# Patient Record
Sex: FEMALE | NOT HISPANIC OR LATINO | URBAN - METROPOLITAN AREA
[De-identification: names, ages, dates, MRNs, and addresses within clinical notes are randomized per-mention and may not be internally consistent; named-entity substitution may affect disease eponyms.]

---

## 2017-10-16 ENCOUNTER — OFFICE VISIT (OUTPATIENT)
Dept: OPHTHALMOLOGY | Facility: CLINIC | Age: 57
End: 2017-10-16

## 2017-10-16 DIAGNOSIS — D49.2 GROWTH OF EYELID: ICD-10-CM

## 2017-10-16 DIAGNOSIS — H52.223 REGULAR ASTIGMATISM OF BOTH EYES: ICD-10-CM

## 2017-10-16 DIAGNOSIS — H02.889 MEIBOMIAN GLAND DYSFUNCTION: ICD-10-CM

## 2017-10-16 DIAGNOSIS — Z96.1 PSEUDOPHAKIA OF BOTH EYES: ICD-10-CM

## 2017-10-16 DIAGNOSIS — E11.9 TYPE 2 DIABETES MELLITUS WITHOUT RETINOPATHY (H): Primary | ICD-10-CM

## 2017-10-16 DIAGNOSIS — H40.003 GLAUCOMA SUSPECT OF BOTH EYES: ICD-10-CM

## 2017-10-16 ASSESSMENT — REFRACTION
OS_AXIS: 012
OD_AXIS: 013
OS_SPHERE: -2.75
OD_SPHERE: -1.50
OD_CYLINDER: +1.25
OS_CYLINDER: +1.00

## 2017-10-16 ASSESSMENT — REFRACTION_WEARINGRX
OD_CYLINDER: +1.00
OS_SPHERE: -3.00
OD_SPHERE: -1.50
OD_AXIS: 038
OS_AXIS: 015
OS_CYLINDER: +1.00
SPECS_TYPE: BIFOCAL
OS_ADD: +2.50
OD_ADD: +2.50

## 2017-10-16 ASSESSMENT — EXTERNAL EXAM - LEFT EYE: OS_EXAM: NORMAL

## 2017-10-16 ASSESSMENT — CUP TO DISC RATIO
OS_RATIO: 0.6
OD_RATIO: 0.7

## 2017-10-16 ASSESSMENT — REFRACTION_MANIFEST
OD_CYLINDER: +1.00
OS_CYLINDER: +1.00
OD_ADD: +2.50
OD_SPHERE: -1.50
OS_AXIS: 014
OS_SPHERE: -3.00
OD_AXIS: 040
OS_ADD: +2.50

## 2017-10-16 ASSESSMENT — TONOMETRY
OD_IOP_MMHG: 12
OS_IOP_MMHG: 15
IOP_METHOD: ICARE

## 2017-10-16 ASSESSMENT — VISUAL ACUITY
OS_CC+: -2
OD_CC+: +1
OS_CC: 20/30
CORRECTION_TYPE: GLASSES
METHOD: SNELLEN - LINEAR
OS_CC: J2
OD_CC: J2
OD_CC: 20/40

## 2017-10-16 ASSESSMENT — CONF VISUAL FIELD
METHOD: COUNTING FINGERS
OS_NORMAL: 1
OD_NORMAL: 1

## 2017-10-16 NOTE — MR AVS SNAPSHOT
After Visit Summary   10/16/2017    Mariam Balderrama    MRN: [id number]           Patient Information     Date Of Birth          1960        Visit Information        Provider Department      10/16/2017 1:00 PM Karthik Kennedy, OD [facility]        Today's Diagnoses     Type 2 diabetes mellitus without retinopathy (H)    -  1    Regular astigmatism of both eyes        Glaucoma suspect of both eyes        Pseudophakia of both eyes           Follow-ups after your visit        Follow-up notes from your care team     Return in about 1 year (around 10/16/2018) for Comprehensive Eye Exam.      Your next 10 appointments already scheduled     Oct 19, 2017  2:00 PM CDT   TECH with MM UMP MME TECH   [facility]    [facility]  [address] [address] [phone]              Future tests that were ordered for you today     Open Future Orders        Priority Expected Expires Ordered    OCT Optic Nerve RNFL Optovue OU (both eyes) Routine  4/16/2019 10/16/2017    OVF 24-2 Dynamic OU Routine  10/16/2018 10/16/2017            Who to contact     Please call your clinic at [phone] to:    Ask questions about your health    Make or cancel appointments    Discuss your medicines    Learn about your test results    Speak to your doctor   If you have compliments or concerns about an experience at your clinic, or if you wish to file a complaint, please contact [facility] Patient Relations at [phone] or email us at [email or web address]         Additional Information About Your Visit        MyChart Information     moziy is an electronic gateway that provides easy, online access to your medical records. With moziy, you can request a clinic appointment, read your test results, renew a prescription or communicate with your care team.     To sign up  for MyChart visit the website at [email or web address]   You will be asked to enter the access code listed below, as well as some personal information. Please follow the directions to create your username and password.     Your access code is: [email or web address]  Expires: 2018  6:31 AM     Your access code will  in 90 days. If you need help or a new code, please contact your [facility] or call [phone] for assistance.        Care EveryWhere ID     This is your Care EveryWhere ID. This could be used by other organizations to access your [facility] medical records  [id number]         Blood Pressure from Last 3 Encounters:   No data found for BP    Weight from Last 3 Encounters:   No data found for Wt              We Performed the Following     REFRACTION [00382]          Today's Medication Changes          These changes are accurate as of: 10/16/17  2:31 PM.  If you have any questions, ask your nurse or doctor.               These medicines have changed or have updated prescriptions.        Dose/Directions    metFORMIN 1000 MG tablet   Commonly known as:  GLUCOPHAGE   This may have changed:  Another medication with the same name was removed. Continue taking this medication, and follow the directions you see here.   Changed by:  Karthik Kennedy, OD        Dose:  1000 mg   Take 1,000 mg by mouth 2 times daily (with meals)   Refills:  0                Primary Care Provider Office Phone # Fax #    Luisana Crawford [phone]       [facility] [address]  [facility] [address]        Equal Access to Services     VENITA WILKINS : Hadii river erwin hadasho Soliloali, waaxda luqadaha, qaybta kaalmada adeegyagallo, idania patel. So [facility] [phone].    ATENCIÓN: Si habla español, tiene a garza disposición servicios gratuitos de asistencia lingüística. Llame al [phone].    We comply with applicable federal civil rights laws and  [place] laws. We do not discriminate on the basis of race, color, national origin, age, disability, sex, sexual orientation, or gender identity.            Thank you!     Thank you for choosing [facility]  for your care. Our goal is always to provide you with excellent care. Hearing back from our patients is one way we can continue to improve our services. Please take a few minutes to complete the written survey that you may receive in the mail after your visit with us. Thank you!             Your Updated Medication List - Protect others around you: Learn how to safely use, store and throw away your medicines at www.disposemymeds.org.          This list is accurate as of: 10/16/17  2:31 PM.  Always use your most recent med list.                   Brand Name Dispense Instructions for use Diagnosis    INSULIN ASPART SC      Inject 1 Dose Subcutaneous 2 times daily        metFORMIN 1000 MG tablet    GLUCOPHAGE     Take 1,000 mg by mouth 2 times daily (with meals)        * UNKNOWN TO PATIENT      Take 1 tablet by mouth daily For cholesterol        * UNKNOWN TO PATIENT      Take 1 tablet by mouth daily Anti-depressant        * Notice:  This list has 2 medication(s) that are the same as other medications prescribed for you. Read the directions carefully, and ask your doctor or other care provider to review them with you.

## 2017-10-16 NOTE — NURSING NOTE
Chief Complaints and History of Present Illnesses   Patient presents with     COMPREHENSIVE EYE EXAM     HPI    Affected eye(s):  Both   Symptoms:     Decreased vision (Comment: at distance)   No difficulty with reading   No floaters   No flashes   Dryness   Burning      Duration:  2 weeks   Frequency:  Constant       Do you have eye pain now?:  No      Comments:   Chief Complaints and History of Present Illnesses   Patient presents with     COMPREHENSIVE EYE EXAM     HPI    Affected eye(s):  Both   Symptoms:     Decreased vision (Comment: at distance)   No difficulty with reading   No floaters   No flashes   Dryness   Burning      Duration:  2 weeks   Frequency:  Constant       Do you have eye pain now?:  No      Comments:    Chief Complaints and History of Present Illnesses   Patient presents with     COMPREHENSIVE EYE EXAM     HPI    Affected eye(s):  Both   Symptoms:     Decreased vision (Comment: at distance)   No difficulty with reading   No floaters   No flashes   Dryness   Burning      Duration:  2 weeks   Frequency:  Constant       Do you have eye pain now?:  No      Comments:  Pt dx: DM 2 four years ago. Started insulin one year ago; also on Metformin 1000 BID.  Pt states avg FBS for last two weeks has been 240 - 260. She states this is when she started noted diminished VA at distance.   A1C was 12 three months ago.  Last eye exam / [facility] 1 year ago. / PG from that visit.  Nancy Pitt COT 1:16 PM 10/16/2017

## 2017-10-16 NOTE — PROGRESS NOTES
Assessment/Plan  (E11.9) Type 2 diabetes mellitus without retinopathy (H)  (primary encounter diagnosis)  Comment: No retinopathy OU  Plan:  Educated patient on condition and clinical findings. Dispensed spectacle prescription for full time wear. Educated patient on possibility of adaptation period, if symptoms do not improve return to clinic for further testing.    (H52.223) Regular astigmatism of both eyes  Plan: REFRACTION [35399]         Educated patient on condition and clinical findings. Dispensed spectacle prescription for full time wear. Educated patient on possibility of adaptation period, if symptoms do not improve return to clinic for further testing.    (H40.003) Glaucoma suspect of both eyes  Comment: Secondary to significant cupping OU, patient states she had a laser procedure 1 year ago  Plan: OCT Optic Nerve RNFL Optovue OU (both eyes), OVF 24-2 Dynamic OU         Return to clinic in 1 month for visual field, oct, and applanation tonometry. Patient to provide previous records at that time.    (Z96.1) Pseudophakia of both eyes  Comment: Stable.   Plan: Monitor annually.    (H02.89) Meibomian gland dysfunction  Comment: Symptomatic  Plan:  Recommended warm compresses twice each day for ten minutes and artificial tears prn. Monitor annually, or sooner if symptoms worsen.    (D49.2) Growth of eyelid  Comment: Patient reports lesion is getting larger  Plan:  Referred to Dr. Andino for consultation.    Return to clinic in 1 month for glaucoma work-up.    Complete documentation of historical and exam elements from today's encounter can  be found in the full encounter summary report (not reduplicated in this progress  note). I personally obtained the chief complaint(s) and history of present illness. I  confirmed and edited as necessary the review of systems, past medical/surgical  history, family history, social history, and examination findings as documented by  others; and I examined the patient  myself. I personally reviewed the relevant tests,  images, and reports as documented above. I formulated and edited as necessary the  assessment and plan and discussed the findings and management plan with the  patient and family.    Karthik Kennedy OD, FAAO

## 2017-10-19 ENCOUNTER — ALLIED HEALTH/NURSE VISIT (OUTPATIENT)
Dept: OPHTHALMOLOGY | Facility: CLINIC | Age: 57
End: 2017-10-19

## 2017-10-19 DIAGNOSIS — H40.003 GLAUCOMA SUSPECT OF BOTH EYES: Primary | ICD-10-CM

## 2017-10-19 ASSESSMENT — VISUAL ACUITY
OS_CC: 20/30
METHOD: SNELLEN - LINEAR
OD_CC: 20/40

## 2017-10-19 ASSESSMENT — SLIT LAMP EXAM - LIDS
COMMENTS: TR MGD
COMMENTS: TR MGD

## 2017-10-19 ASSESSMENT — TONOMETRY
IOP_METHOD: APPLANATION
OS_IOP_MMHG: 16
OD_IOP_MMHG: 18

## 2017-10-19 ASSESSMENT — REFRACTION_WEARINGRX
OS_CYLINDER: +1.00
OS_AXIS: 015
OD_SPHERE: -1.50
OD_ADD: +2.50
OS_ADD: +2.50
OS_SPHERE: -2.75
OD_CYLINDER: +1.00
SPECS_TYPE: BIFOCAL
OD_AXIS: 038

## 2017-10-19 ASSESSMENT — CONF VISUAL FIELD
OD_NORMAL: 1
OS_NORMAL: 1

## 2017-10-19 NOTE — PROGRESS NOTES
Assessment/Plan  (H40.003) Glaucoma suspect of both eyes  (primary encounter diagnosis)  Comment: Likely physiological cupping OU, normal OCT and clean fields OU  Plan: OCT Optic Nerve RNFL Spectralis OU (both eyes), HVF 24-2 OU    Educated patient on clinical findings. Monitor annually, no treatment indicated at this time.    This visit billed as technician only.    Complete documentation of historical and exam elements from today's encounter can  be found in the full encounter summary report (not reduplicated in this progress  note). I personally obtained the chief complaint(s) and history of present illness. I  confirmed and edited as necessary the review of systems, past medical/surgical  history, family history, social history, and examination findings as documented by  others; and I examined the patient myself. I personally reviewed the relevant tests,  images, and reports as documented above. I formulated and edited as necessary the  assessment and plan and discussed the findings and management plan with the  patient and family.    Karthik Kennedy, BLADIMIR, FAAO

## 2017-10-19 NOTE — MR AVS SNAPSHOT
After Visit Summary   10/19/2017    Mariam Balderrama    MRN: [id number]           Patient Information     Date Of Birth          1960        Visit Information        Provider Department      10/19/2017 2:00 PM [email or web address]         Follow-ups after your visit        Your next 10 appointments already scheduled     Oct 31, 2017  8:00 AM CDT   (Arrive by 7:45 AM)   NEW PLASTICS with Thanh Andino MD   [facility] ([facility] [place])    [address] [place] [address]  4th [facility] [address] [phone]              Who to contact     Please call your clinic at [phone] to:    Ask questions about your health    Make or cancel appointments    Discuss your medicines    Learn about your test results    Speak to your doctor   If you have compliments or concerns about an experience at your clinic, or if you wish to file a complaint, please contact [facility] Patient Relations at [phone] or email us at [email or web address]         Additional Information About Your Visit        MyChart Information     Amprius is an electronic gateway that provides easy, online access to your medical records. With Amprius, you can request a clinic appointment, read your test results, renew a prescription or communicate with your care team.     To sign up for Etixt visit the website at [email or web address]   You will be asked to enter the access code listed below, as well as some personal information. Please follow the directions to create your username and password.     Your access code is: [email or web address]  Expires: 2018  6:31 AM     Your access code will  in 90 days. If you need help or a new code, please contact your [facility] or call [phone] for assistance.        Care EveryWhere ID     This is your Care EveryWhere ID. This could be used by other  organizations to access your [place] medical records  [id number]         Blood Pressure from Last 3 Encounters:   No data found for BP    Weight from Last 3 Encounters:   No data found for Wt              Today, you had the following     No orders found for display       Primary Care Provider Office Phone # Fax #    Luisana Crawford [phone]       [facility] [address]  [facility] [address]        Equal Access to Services     VENITA WILKINS : Hadii aad ku hadasho Soomaali, waaxda luqadaha, qaybta kaalmada adeegyada, waxay idiin hayaan adeeg khmarthash lajesusita ah. So [facility] [phone].    ATENCIÓN: Si habla español, tiene a garza disposición servicios gratuitos de asistencia lingüística. Llame al [phone].    We comply with applicable federal civil rights laws and [place] laws. We do not discriminate on the basis of race, color, national origin, age, disability, sex, sexual orientation, or gender identity.            Thank you!     Thank you for choosing [facility]  for your care. Our goal is always to provide you with excellent care. Hearing back from our patients is one way we can continue to improve our services. Please take a few minutes to complete the written survey that you may receive in the mail after your visit with us. Thank you!             Your Updated Medication List - Protect others around you: Learn how to safely use, store and throw away your medicines at www.disposemymeds.org.          This list is accurate as of: 10/19/17  3:09 PM.  Always use your most recent med list.                   Brand Name Dispense Instructions for use Diagnosis    INSULIN ASPART SC      Inject 1 Dose Subcutaneous 2 times daily        metFORMIN 1000 MG tablet    GLUCOPHAGE     Take 1,000 mg by mouth 2 times daily (with meals)        * UNKNOWN TO PATIENT      Take 1 tablet by mouth daily For cholesterol        * UNKNOWN TO PATIENT      Take 1 tablet by mouth daily  Anti-depressant        * Notice:  This list has 2 medication(s) that are the same as other medications prescribed for you. Read the directions carefully, and ask your doctor or other care provider to review them with you.

## 2017-10-19 NOTE — NURSING NOTE
Chief Complaints and History of Present Illnesses   Patient presents with     Glaucoma Suspect Follow Up     OVF 24-2/OCT today     HPI    Affected eye(s):  Both   Symptoms:        Duration:  1 week   Frequency:  Constant       Do you have eye pain now?:  No      Comments:  No eye medications including over the counter  June Espinoza COT 2:22 PM October 19, 2017

## 2017-10-31 ENCOUNTER — OFFICE VISIT (OUTPATIENT)
Dept: OPHTHALMOLOGY | Facility: CLINIC | Age: 57
End: 2017-10-31

## 2017-10-31 DIAGNOSIS — H05.89 ORBITAL MASS: Primary | ICD-10-CM

## 2017-10-31 RX ORDER — ERYTHROMYCIN 5 MG/G
1 OINTMENT OPHTHALMIC 2 TIMES DAILY
Qty: 1 TUBE | Refills: 0
Start: 2017-10-31 | End: 2017-11-07

## 2017-10-31 RX ORDER — LIDOCAINE HYDROCHLORIDE AND EPINEPHRINE 15; 5 MG/ML; UG/ML
1 INJECTION, SOLUTION EPIDURAL ONCE
Qty: 1 ML | Refills: 0 | OUTPATIENT
Start: 2017-10-31 | End: 2017-10-31

## 2017-10-31 ASSESSMENT — SLIT LAMP EXAM - LIDS: COMMENTS: NORMAL

## 2017-10-31 ASSESSMENT — VISUAL ACUITY
OS_CC: 20/25
OD_CC: 20/40
OD_CC+: -
METHOD: SNELLEN - LINEAR
CORRECTION_TYPE: GLASSES

## 2017-10-31 ASSESSMENT — TONOMETRY
OD_IOP_MMHG: 15
IOP_METHOD: ICARE
OS_IOP_MMHG: 15

## 2017-10-31 ASSESSMENT — CONF VISUAL FIELD
OD_NORMAL: 1
OS_NORMAL: 1

## 2017-10-31 NOTE — LETTER
10/31/2017         RE:  :  MRN: Mariam Balderrama  1960  [id number]     Dear Dr. Kennedy,    Thank you for asking me to see your patient, Mariam Balderrama, for an oculoplastic   consultation.  My assessment and plan are below.  For further details, please see my attached clinic note.          Assessment & Plan     Mariam Balderrama is a 57 year old female with the following diagnoses:   1. Orbital mass      Discussed options, plan excision of the medial canthus mass through mini-Monroe style incision.        Again, thank you for allowing me to participate in the care of your patient.      Sincerely,    Thanh Andino MD  Department of Ophthalmology and Visual Neurosciences  [facility]    CC: SOPHIE  [facility]   [address] [place] [id number]  VIA Facsimile: [phone]

## 2017-10-31 NOTE — PROGRESS NOTES
Chief Complaints and History of Present Illnesses   Patient presents with     Consult For     lesion     Right medial canthus mass. Present for two years.  Enlarging.  Not painful at baseline but her glasses sit on it and causes irritation and discomfort.   No history of trauma to the area.          Assessment & Plan     Mariam Balderrama is a 57 year old female with the following diagnoses:   1. Orbital mass      Discussed options, plan excision of the medial canthus mass through mini-Monroe style incision.         Complete documentation of historical and exam elements from today's encounter can be found in the full encounter summary report (not reduplicated in this progress note). I personally obtained the chief complaint(s) and history of present illness.  I confirmed and edited as necessary the review of systems, past medical/surgical history, family history, social history, and examination findings as documented by others; and I examined the patient myself. I personally reviewed the relevant tests, images, and reports as documented above. I formulated and edited as necessary the assessment and plan and discussed the findings and management plan with the patient and family. - Thanh Andino MD    Preoperative diagnosis: Right medial canthus mass  Postoperative diagnosis: Right medial canthus mass  Surgeon: Thanh Andino MD  Procedure: Excision of right medial canthus mass  Anesthesia: 1.5% lidocaine with epinephrine  Specimen: Right medial canthus cystic mass  Description of procedure:  Mariam Balderrama was brought to the minor procedure room. The area was infiltrated with local anesthetic as above. She was prepped and draped in the usual sterile fashion. A 15 blade was used to make an incision in the medial canthal area. Dissection was carried out posteriorly in the direction of the medial orbit. A well-demarcated cystic mass was encountered. This was dissected out with accommodation of blunt and sharp dissection.  Hemostasis was then obtained with cautery. The area was inspected, and then the incision was closed with interrupted 6-0 plain gut sutures. She tolerated the procedure well. Erythromycin ophthalmic ointment was applied.   I performed the entire procedure  Thanh Andino MD

## 2017-10-31 NOTE — MR AVS SNAPSHOT
After Visit Summary   10/31/2017    Mairam Balderrama    MRN: [id number]           Patient Information     Date Of Birth          1960        Visit Information        Provider Department      10/31/2017 7:45 AM Thanh Andino MD; [facility]        Today's Diagnoses     Orbital mass    -  1       Follow-ups after your visit        Future tests that were ordered for you today     Open Future Orders        Priority Expected Expires Ordered    Surgical pathology exam Routine  2018 10/31/2017            Who to contact     Please call your clinic at [phone] to:    Ask questions about your health    Make or cancel appointments    Discuss your medicines    Learn about your test results    Speak to your doctor   If you have compliments or concerns about an experience at your clinic, or if you wish to file a complaint, please contact [facility] Patient Relations at [phone] or email us at [email or web address]         Additional Information About Your Visit        MyChart Information     ACTV8t is an electronic gateway that provides easy, online access to your medical records. With The Combine, you can request a clinic appointment, read your test results, renew a prescription or communicate with your care team.     To sign up for ACTV8t visit the website at [email or web address]   You will be asked to enter the access code listed below, as well as some personal information. Please follow the directions to create your username and password.     Your access code is: [email or web address]  Expires: 2018  6:31 AM     Your access code will  in 90 days. If you need help or a new code, please contact your [facility] or call [phone] for assistance.        Care EveryWhere ID     This is your Care EveryWhere ID. This could be used by other organizations to access your [facility]  records  [id number]         Blood Pressure from Last 3 Encounters:   No data found for BP    Weight from Last 3 Encounters:   No data found for Wt              We Performed the Following     External Photos OU (both eyes)     Surgical pathology exam          Today's Medication Changes          These changes are accurate as of: 10/31/17  4:20 PM.  If you have any questions, ask your nurse or doctor.               Start taking these medicines.        Dose/Directions    erythromycin ophthalmic ointment   Commonly known as:  ROMYCIN   Started by:  Thanh Andino MD        Dose:  1 Application   Place 1 Application into the right eye 2 times daily for 7 days   Quantity:  1 Tube   Refills:  0       lidocaine-EPINEPHrine 1.5 %-1:925263 Soln injection   Started by:  Thanh Andino MD        Dose:  1 mL   1 mL by Other route once for 1 dose   Quantity:  1 mL   Refills:  0            Where to get your medicines      Some of these will need a paper prescription and others can be bought over the counter.  Ask your nurse if you have questions.     You don't need a prescription for these medications     erythromycin ophthalmic ointment    lidocaine-EPINEPHrine 1.5 %-1:179812 Soln injection                Primary Care Provider Office Phone # Fax #    Luisanakimi Carlsonba [phone]       [facility] [address]  [facility] [address]        Equal Access to Services     VENITA WILKINS AH: Harry arreolao Soliloali, waaxda luqadaha, qaybta kaalmada adeegyada, idania patel. So [facility] [phone].    ATENCIÓN: Si habla español, tiene a garza disposición servicios gratuitos de asistencia lingüística. Llame al [phone].    We comply with applicable federal civil rights laws and [place] laws. We do not discriminate on the basis of race, color, national origin, age, disability, sex, sexual orientation, or gender identity.            Thank you!     Thank you for choosing [facility]  for your care. Our goal is always to provide you with excellent care. Hearing back from our patients is one way we can continue to improve our services. Please take a few minutes to complete the written survey that you may receive in the mail after your visit with us. Thank you!             Your Updated Medication List - Protect others around you: Learn how to safely use, store and throw away your medicines at www.disposemymeds.org.          This list is accurate as of: 10/31/17  4:20 PM.  Always use your most recent med list.                   Brand Name Dispense Instructions for use Diagnosis    erythromycin ophthalmic ointment    ROMYCIN    1 Tube    Place 1 Application into the right eye 2 times daily for 7 days        INSULIN ASPART SC      Inject 1 Dose Subcutaneous 2 times daily        lidocaine-EPINEPHrine 1.5 %-1:777888 Soln injection     1 mL    1 mL by Other route once for 1 dose        metFORMIN 1000 MG tablet    GLUCOPHAGE     Take 1,000 mg by mouth 2 times daily (with meals)        * UNKNOWN TO PATIENT      Take 1 tablet by mouth daily For cholesterol        * UNKNOWN TO PATIENT      Take 1 tablet by mouth daily Anti-depressant        * Notice:  This list has 2 medication(s) that are the same as other medications prescribed for you. Read the directions carefully, and ask your doctor or other care provider to review them with you.

## 2017-10-31 NOTE — LETTER
"November 3, 2017      Mariam Balderrama  [address] [address]        Dear Mariam,    Please see below for your test results. 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     Resulted Orders   Surgical pathology exam   Result Value Ref Range    Copath Report       Patient Name: MARIAM BALDERRAMA  MR#: [id number]  Specimen #: [id number]  Collected: 10/31/2017  Received: 10/31/2017  Reported: 11/2/2017 16:20  Ordering Phy(s): MANNY SANTOYO    For improved result formatting, select 'View Enhanced Report Format'  under Linked Documents section.    SPECIMEN(S):  Skin, right medial canthus    FINAL DIAGNOSIS:  A. Skin, right medial canthus:  - Benign adnexal tumor with ductal differentiation, most consistent with  chondroid syringoma  (see description)    I have personally reviewed all specimens and/or slides, including the  listed special stains, and used them with my medical judgement to  determine or confirm the final diagnosis.    Electronically signed out by:    Chin Lee M.D., PhD, Physicians    GROSS:  The specimen is received in formalin with proper patient identification,  labeled \"right medial canthal\", and consists of a 0.6 x 0.3 x 0.3 cm  well-circumscribed portion of pink-tan, rubbery soft tissue.  A  definitive epithelium is not g rossly appreciated.  The surface is inked  black, and the specimen is bisected and entirely submitted in one  cassette. (Dictated by: Loretta White 10/31/2017 11:56 AM)    MICROSCOPIC:  A. The specimen exhibits a lobular, nonencapsulated tumor with a  prominently myxoid stroma, which consists of small cords of  bland-appearing epithelial cells with ductal differentiation and small  foci of myxoid differentiation.  The lesion extends to the margins.    CPT Codes:  A: 36968-AH2.T, 96713-BO4.P    TESTING LAB LOCATION:  [facility], [address]   " [id number]  [phone]    COLLECTION SITE:  Client: [facility] [place] [facility] [place]           If you have any questions, please call the clinic to make an appointment.    Sincerely,    Thanh Andino MD    Oculoplastic and Orbital Surgery   Department of Ophthalmology and Visual Neurosciences  [facility]

## 2017-10-31 NOTE — NURSING NOTE
Chief Complaints and History of Present Illnesses   Patient presents with     Consult For     lesion     HPI    Affected eye(s):  Both   Symptoms:     No blurred vision      Frequency:  Constant       Do you have eye pain now?:  No      Comments:  Spot by the nose, right side. Has been there for 2 years. No pain until putting on glasses. Glasses don't sit correctly on her face because of it. It seems to be slowly getting bigger over time.    Nicci Miles COT 8:10 AM October 31, 2017

## 2017-11-02 LAB — COPATH REPORT: NORMAL

## 2018-06-06 ENCOUNTER — OFFICE VISIT (OUTPATIENT)
Dept: OPHTHALMOLOGY | Facility: CLINIC | Age: 58
End: 2018-06-06
Payer: COMMERCIAL

## 2018-06-06 DIAGNOSIS — E11.9 TYPE 2 DIABETES MELLITUS WITHOUT RETINOPATHY (H): ICD-10-CM

## 2018-06-06 DIAGNOSIS — H52.223 REGULAR ASTIGMATISM OF BOTH EYES: ICD-10-CM

## 2018-06-06 DIAGNOSIS — H04.123 DRY EYES, BILATERAL: Primary | ICD-10-CM

## 2018-06-06 DIAGNOSIS — H40.003 GLAUCOMA SUSPECT OF BOTH EYES: ICD-10-CM

## 2018-06-06 ASSESSMENT — TONOMETRY
OD_IOP_MMHG: 16
OS_IOP_MMHG: 17
IOP_METHOD: ICARE

## 2018-06-06 ASSESSMENT — VISUAL ACUITY
CORRECTION_TYPE: GLASSES
OD_CC: 20/30
METHOD: SNELLEN - LINEAR
OD_CC+: -2
OS_CC: 20/30

## 2018-06-06 ASSESSMENT — SLIT LAMP EXAM - LIDS
COMMENTS: NORMAL
COMMENTS: NORMAL

## 2018-06-06 ASSESSMENT — CONF VISUAL FIELD
METHOD: COUNTING FINGERS
OS_NORMAL: 1
OD_NORMAL: 1

## 2018-06-06 NOTE — NURSING NOTE
Chief Complaints and History of Present Illnesses   Patient presents with     Eye Problem Both Eyes     HPI    Affected eye(s):  Both   Symptoms:     No blurred vision   No decreased vision   Puffy eyes   Redness   Dryness      Duration:  3 weeks   Frequency:  Constant       Do you have eye pain now?:  No      Comments:  Had laser by [facility] in [place] office,  to reduce puffiness of bilateral lower lids 3 weeks ago. Since then above sign/symptoms started.  Not using art tears. Using prescription eye cream.    Nancy Pitt COT 2:10 PM 06/06/2018

## 2018-06-06 NOTE — MR AVS SNAPSHOT
After Visit Summary   2018    Mariam Balderrama    MRN: [id number]           Patient Information     Date Of Birth          1960        Visit Information        Provider Department      2018 1:40 PM Cleopatra Meyers MD [facility]        Today's Diagnoses     Dry eyes, bilateral - Both Eyes    -  1    Type 2 diabetes mellitus without retinopathy (H) - Both Eyes        Regular astigmatism of both eyes - Both Eyes        Glaucoma suspect of both eyes - Both Eyes           Follow-ups after your visit        Follow-up notes from your care team     Return in about 1 month (around 2018) for Comprehensive Exam, follow up- dry eye.      Your next 10 appointments already scheduled     2018  1:20 PM CDT   Return Visit with Cleopatra Meyers MD   [facility] ([facility])    [facility]  [address] [id number] [phone]              Who to contact     Please call your clinic at [phone] to:    Ask questions about your health    Make or cancel appointments    Discuss your medicines    Learn about your test results    Speak to your doctor            Additional Information About Your Visit        MyChart Information     [employer] is an electronic gateway that provides easy, online access to your medical records. With [employer], you can request a clinic appointment, read your test results, renew a prescription or communicate with your care team.     To sign up for treadalongt visit the website at [email or web address]   You will be asked to enter the access code listed below, as well as some personal information. Please follow the directions to create your username and password.     Your access code is: [email or web address]  Expires: 2018  6:30 AM     Your access code will  in 90 days. If you need help or a new code, please contact your [facility] or call [phone] for assistance.        Care EveryWhere ID     This is your Care EveryWhere ID. This could be used by other organizations to access your [facility] medical records  [id number]         Blood Pressure from Last 3 Encounters:   No data found for BP    Weight from Last 3 Encounters:   No data found for Wt              Today, you had the following     No orders found for display       Primary Care Provider Office Phone # Fax #    Luisana Crawford [phone]       [facility] [address]  [facility] [address]        Equal Access to Services     [facility] : Hadii aad ku hadasho Soomaali, waaxda luqadaha, qaybta kaalmada adeegyada, waxay idiin hayaan adedave starkey . So [facility] [phone].    ATENCIÓN: Si habla español, tiene a garza disposición servicios gratuitos de asistencia lingüística. Llame al [phone].    We comply with applicable federal civil rights laws and [place] laws. We do not discriminate on the basis of race, color, national origin, age, disability, sex, sexual orientation, or gender identity.            Thank you!     Thank you for choosing [facility]  for your care. Our goal is always to provide you with excellent care. Hearing back from our patients is one way we can continue to improve our services. Please take a few minutes to complete the written survey that you may receive in the mail after your visit with us. Thank you!             Your Updated Medication List - Protect others around you: Learn how to safely use, store and throw away your medicines at www.disposemymeds.org.          This list is accurate as of 6/6/18  2:39 PM.  Always use your most recent med list.                   Brand Name Dispense Instructions for use Diagnosis    INSULIN ASPART SC      Inject 1 Dose Subcutaneous 2 times daily        metFORMIN 1000 MG tablet    GLUCOPHAGE     Take 1,000 mg by mouth 2 times daily  (with meals)        * UNKNOWN TO PATIENT      Take 1 tablet by mouth daily For cholesterol        * UNKNOWN TO PATIENT      Take 1 tablet by mouth daily Anti-depressant        * Notice:  This list has 2 medication(s) that are the same as other medications prescribed for you. Read the directions carefully, and ask your doctor or other care provider to review them with you.

## 2018-06-06 NOTE — PROGRESS NOTES
ALVARADO Balderrama is a 58 year old female here for dry eye symptoms.  Had laser by [facility] in [place] office, to reduce puffiness of bilateral lower lids 3 weeks ago. Since then above sign/symptoms started.  Eyes were closed/covered during laser procedure.  Using prescription eye cream. No drops.     PMH:  Diabetes mellitus 2 on insulin, asthma, hyperlipidemia   POH:  Glasses for myopic astigmatism, no surgery, no trauma, right medial canthal syringoma excision   Oc Meds:  none    Assessment & Plan      (H04.123) Dry eyes, bilateral - Both Eyes  (primary encounter diagnosis)  Comment:  No corneal signs today but symptoms of mild dry eye and conjunctival staining.  Plan:  Recommend lubrication with artificial tear drops liberally (at least 4-6 times daily) to both eyes, preservative-free if using more frequently.    Discussed with patient environmental factors to avoid and examples of brands.    Consider prescription medication or punctal plugs if not improved with above     (E11.9) Type 2 diabetes mellitus without retinopathy (H) - Both Eyes  Comment: No retinopathy both eyes last exam was 10/2017  Plan:  Dilated fundus exam in 4-6 months     (H52.223) Regular astigmatism of both eyes - Both Eyes  Comment: may need update  Plan: treat dry eye first     (H40.003) Glaucoma suspect of both eyes - Both Eyes  Comment: prior [facility] and oct within normal limits 2017  Plan: follow clinically     -----------------------------------------------------------------------------------    Patient disposition:   Return in about 1 month (around 7/6/2018) for follow up- dry eye. Call for sooner appointment as needed.    Complete documentation of historical and exam elements from today's encounter can be found in the full encounter summary report (not reduplicated in this progress note). I personally obtained the chief complaint(s) and history of present illness.  I have confirmed and edited as necessary the  CC, HPI, PMH/PSH, social history, FMH, ROS, and exam/neuro findings as obtained by the technician or others. I have examined this patient myself and I personally viewed the image(s) and studies listed above and the documentation reflects my findings and interpretation.  I formulated and edited as necessary the assessment and plan and discussed the findings and management plan with the patient and family.     Cleopatra Meyers MD

## 2018-06-12 ENCOUNTER — TELEPHONE (OUTPATIENT)
Dept: OPHTHALMOLOGY | Facility: CLINIC | Age: 58
End: 2018-06-12

## 2019-01-28 ENCOUNTER — TRANSFERRED RECORDS (OUTPATIENT)
Dept: HEALTH INFORMATION MANAGEMENT | Facility: CLINIC | Age: 59
End: 2019-01-28

## 2019-01-28 LAB — PAP-ABSTRACT: NORMAL

## 2019-02-04 ENCOUNTER — MEDICAL CORRESPONDENCE (OUTPATIENT)
Dept: HEALTH INFORMATION MANAGEMENT | Facility: CLINIC | Age: 59
End: 2019-02-04

## 2019-03-20 ENCOUNTER — OFFICE VISIT (OUTPATIENT)
Dept: OPHTHALMOLOGY | Facility: CLINIC | Age: 59
End: 2019-03-20
Payer: COMMERCIAL

## 2019-03-20 DIAGNOSIS — H01.001 BLEPHARITIS OF UPPER EYELIDS OF BOTH EYES, UNSPECIFIED TYPE: Primary | ICD-10-CM

## 2019-03-20 DIAGNOSIS — H04.123 DRY EYES, BILATERAL: ICD-10-CM

## 2019-03-20 DIAGNOSIS — H01.004 BLEPHARITIS OF UPPER EYELIDS OF BOTH EYES, UNSPECIFIED TYPE: Primary | ICD-10-CM

## 2019-03-20 RX ORDER — PHENOL 1.4 %
1500 AEROSOL, SPRAY (ML) MUCOUS MEMBRANE 2 TIMES DAILY
COMMUNITY

## 2019-03-20 RX ORDER — IBUPROFEN 400 MG/1
400 TABLET, FILM COATED ORAL
COMMUNITY

## 2019-03-20 RX ORDER — METRONIDAZOLE 500 MG/1
500 TABLET ORAL
COMMUNITY
Start: 2017-04-21

## 2019-03-20 RX ORDER — SIMVASTATIN 20 MG
20 TABLET ORAL
COMMUNITY

## 2019-03-20 RX ORDER — ONDANSETRON 4 MG/1
4 TABLET, ORALLY DISINTEGRATING ORAL
COMMUNITY
Start: 2017-10-26

## 2019-03-20 RX ORDER — BISACODYL 10 MG
10 SUPPOSITORY, RECTAL RECTAL AT BEDTIME
COMMUNITY

## 2019-03-20 RX ORDER — LOTEPREDNOL ETABONATE 5 MG/ML
1 SUSPENSION/ DROPS OPHTHALMIC 2 TIMES DAILY
Qty: 1 BOTTLE | Refills: 1 | Status: SHIPPED | OUTPATIENT
Start: 2019-03-20

## 2019-03-20 RX ORDER — ALBUTEROL SULFATE 90 UG/1
2 AEROSOL, METERED RESPIRATORY (INHALATION) PRN
COMMUNITY

## 2019-03-20 RX ORDER — FLUTICASONE PROPIONATE 50 MCG
1 SPRAY, SUSPENSION (ML) NASAL
COMMUNITY

## 2019-03-20 RX ORDER — ESTRADIOL 0.1 MG/G
CREAM VAGINAL
COMMUNITY
Start: 2014-11-26

## 2019-03-20 RX ORDER — IBUPROFEN 600 MG/1
600 TABLET, FILM COATED ORAL
COMMUNITY
Start: 2017-10-26

## 2019-03-20 RX ORDER — MECLIZINE HYDROCHLORIDE 25 MG/1
25 TABLET ORAL PRN
COMMUNITY
Start: 2017-10-26

## 2019-03-20 RX ORDER — LANCETS 30 GAUGE
1 EACH MISCELLANEOUS
COMMUNITY
Start: 2016-01-19

## 2019-03-20 RX ORDER — ALENDRONATE SODIUM 70 MG/1
70 TABLET ORAL WEEKLY
COMMUNITY

## 2019-03-20 RX ORDER — CYCLOBENZAPRINE HCL 10 MG
10 TABLET ORAL DAILY
COMMUNITY

## 2019-03-20 RX ORDER — LISINOPRIL 10 MG/1
10 TABLET ORAL DAILY
COMMUNITY

## 2019-03-20 RX ORDER — GLYBURIDE 5 MG/1
5 TABLET ORAL
COMMUNITY

## 2019-03-20 RX ORDER — ATORVASTATIN CALCIUM 20 MG/1
1 TABLET, FILM COATED ORAL DAILY
COMMUNITY
Start: 2018-03-19

## 2019-03-20 RX ORDER — MICONAZOLE NITRATE 2 %
CREAM WITH APPLICATOR VAGINAL
COMMUNITY
Start: 2017-06-13

## 2019-03-20 ASSESSMENT — TONOMETRY
OD_IOP_MMHG: 15
IOP_METHOD: ICARE
OS_IOP_MMHG: 18

## 2019-03-20 ASSESSMENT — EXTERNAL EXAM - RIGHT EYE: OD_EXAM: NORMAL

## 2019-03-20 ASSESSMENT — VISUAL ACUITY
METHOD: SNELLEN - LINEAR
OD_CC: 20/25
CORRECTION_TYPE: GLASSES
OS_CC: 20/25

## 2019-03-20 ASSESSMENT — EXTERNAL EXAM - LEFT EYE: OS_EXAM: NORMAL

## 2019-03-20 ASSESSMENT — CONF VISUAL FIELD
OD_NORMAL: 1
OS_NORMAL: 1
METHOD: COUNTING FINGERS

## 2019-03-20 NOTE — PROGRESS NOTES
HPI  Mariam Balderrama is a 58 year old female here for dry eye symptoms.  Has been getting worse for the last few months.  Also has some dry mouth.  Eyes burn in the morning, attributes it to face cream which she stopped using.     PMH:  Diabetes mellitus 2 on insulin, asthma, hyperlipidemia   POH:  Glasses for myopic astigmatism, no surgery, no trauma, right medial canthal syringoma excision   Oc Meds:  none    Assessment & Plan      (H01.001,  H01.004) Blepharitis of upper eyelids of both eyes, unspecified type - Both Eyes  (primary encounter diagnosis)  Comment: new  Plan: loteprednol (LOTEMAX) 0.5 % ophthalmic         Suspension x 2 weeks         Warm moist compresses and lid hygiene discussed at length    (H04.123) Dry eyes, bilateral - Both Eyes  Comment: increased corneal signs today  -Evaporative   Plan:  Recommend lubrication with artificial tear drops liberally (at least 4-6 times daily) to both eyes, preservative-free if using more frequently.    Discussed with patient environmental factors to avoid and examples of brands to try other than theratears.    Consider prescription medication or punctal plugs if not improved with above          -----------------------------------------------------------------------------------    Patient disposition:   Return in about 1 month (around 4/20/2019) for follow up- dry eye. Call for sooner appointment as needed.    Complete documentation of historical and exam elements from today's encounter can be found in the full encounter summary report (not reduplicated in this progress note). I personally obtained the chief complaint(s) and history of present illness.  I have confirmed and edited as necessary the CC, HPI, PMH/PSH, social history, FMH, ROS, and exam/neuro findings as obtained by the technician or others. I have examined this patient myself and I personally viewed the image(s) and studies listed above and the documentation reflects my findings and interpretation.  I  formulated and edited as necessary the assessment and plan and discussed the findings and management plan with the patient and family.     Cleopatra Meyers MD

## 2019-03-22 ENCOUNTER — TELEPHONE (OUTPATIENT)
Dept: OPHTHALMOLOGY | Facility: CLINIC | Age: 59
End: 2019-03-22

## 2019-05-06 ENCOUNTER — OFFICE VISIT (OUTPATIENT)
Dept: OBGYN | Facility: CLINIC | Age: 59
End: 2019-05-06
Payer: COMMERCIAL

## 2019-05-06 VITALS — WEIGHT: 161 LBS | SYSTOLIC BLOOD PRESSURE: 120 MMHG | HEART RATE: 82 BPM | DIASTOLIC BLOOD PRESSURE: 71 MMHG

## 2019-05-06 DIAGNOSIS — R87.610 ASCUS WITH POSITIVE HIGH RISK HPV CERVICAL: Primary | ICD-10-CM

## 2019-05-06 DIAGNOSIS — R87.810 ASCUS WITH POSITIVE HIGH RISK HPV CERVICAL: Primary | ICD-10-CM

## 2019-05-06 PROCEDURE — 57456 ENDOCERV CURETTAGE W/SCOPE: CPT | Performed by: OBSTETRICS & GYNECOLOGY

## 2019-05-06 PROCEDURE — 88305 TISSUE EXAM BY PATHOLOGIST: CPT | Performed by: OBSTETRICS & GYNECOLOGY

## 2019-05-06 NOTE — NURSING NOTE
Chief Complaint   Patient presents with     Colposcopy       Initial /71   Pulse 82   Wt 73 kg (161 lb)  There is no height or weight on file to calculate BMI.  BP completed using cuff size: regular    Questioned patient about current smoking habits.  Pt. has never smoked.      No obstetric history on file.    The following HM Due: NONE      The following patient reported/Care Every where data was sent to:  P ABSTRACT QUALITY INITIATIVES [[id number]]        N/a      Anna Fritz MA

## 2019-05-06 NOTE — PROGRESS NOTES
Mariam Balderrama is a 58 year old female No obstetric history on file. who presents for a initial colposcopy, referred by [facility] . Pap smear 1 months ago showed: ASCUS with high risk HPV present: other. The prior pap showed with high risk HPV present: other.     No LMP recorded. Patient is premenopausal.  UPT today is not done  Patient does not smoke  Type of contraception: none  Age at first sexual intercourse: 14  Number of sexual partners (lifetime): 2  Past GYN history: No STD history and HPV  Prior cervical/vaginal disease: Normal exam without visible pathology.  Prior cervical treatment: no treatment.    PROCEDURE:  Before the procedure, it was ensured that the patient was educated regarding the nature of her findings to date, the implications, and what was to be done. She has been made aware of the role of HPV, the natural history of infection, ways to minimize her future risk, the effect of HPV on the cervix, and treatment options available should they be indicated. The details of the   colposcopic procedure were reviewed. All questions were answered before proceeding, and informed consent was therefore obtained.  Speculum placed in vagina and excellent visualization of cervix   acheived, cervix swabbed x 3 with acetic acid solution.    FINDINGS:  Cervix: flat, atrophic, stenotic os; no visible lesions  Please refer to images section for details.  Pap repeated?: No  SCJ seen?: no   ECC done?: Yes     With patient in dorsal lithotomy position and with cervix visualized with speculum, the os was dilated with a sterile Hegar dilator to 2 mm.  Then a kervorkian curette was introduced into the os and curettage performed.  Scanty tissue/mucus was obtained.  Endocervical brush was used to obtain more tissue.   Lugol's solution used?: No  Satisfactory examination?: no  ASSESSMENT: Normal exam without visible pathology.  PLAN: specimen labelled and sent to Pathology.  If negative for HSIL plan will be a Pap smear and  HPV in one year.  An  was present due to language barrier.     Aneta Campos MD

## 2019-05-09 ENCOUNTER — RESULT FOLLOW UP (OUTPATIENT)
Dept: OBGYN | Facility: CLINIC | Age: 59
End: 2019-05-09

## 2019-05-09 DIAGNOSIS — R87.610 ASCUS WITH POSITIVE HIGH RISK HPV CERVICAL: ICD-10-CM

## 2019-05-09 DIAGNOSIS — R87.810 ASCUS WITH POSITIVE HIGH RISK HPV CERVICAL: ICD-10-CM

## 2019-05-09 LAB — COPATH REPORT: NORMAL

## 2019-05-09 NOTE — PROGRESS NOTES
2017 NIL, +HR HPV (Care Everywhere)  2/12/19 ASCUS, +HR HPV, not 16/18. Plan colp  5/7/19 [place] Bx and ECC focal atypia of undetermined significance.Plan repeat co-test 1 yr, due by 5/6/20.   05/13/19: I called the pt with a [employer]  and informed her [place] results and recommendations. (sk)  04/21/20 Due to COVID restrictions - Routed to RN to review recommendations . ([facility])  04/21/20:COVID 19 interim plan updated to: Plan unchanged. (sk)

## 2021-03-03 ENCOUNTER — PATIENT OUTREACH (OUTPATIENT)
Dept: OBGYN | Facility: CLINIC | Age: 61
End: 2021-03-03

## 2021-03-03 DIAGNOSIS — R87.810 ASCUS WITH POSITIVE HIGH RISK HPV CERVICAL: ICD-10-CM

## 2021-03-03 DIAGNOSIS — R87.610 ASCUS WITH POSITIVE HIGH RISK HPV CERVICAL: ICD-10-CM

## 2021-03-03 NOTE — LETTER
March 3, 2021      Mariam Balderrama  [address] [address]        Dear ,    This letter is to remind you that you are due for your follow-up Pap smear and Human Papillomavirus (HPV) test.    Please call [phone] to schedule your appointment at your earliest convenience.    If you have completed the appointment outside of the [facility] system, please have the records forwarded to our office. We will update your chart for your provider to review before your next annual wellness visit.     Thank you for choosing [facility]!      Sincerely,    Your [facility] Care Team

## 2021-04-01 NOTE — TELEPHONE ENCOUNTER
Patient due for cotest  Reminder call  done today with help of  Jenn number [phone].    Pt is no longer going to  for her care and has followed up elsewhere. Tracking will be closed.

## 2022-01-08 NOTE — TELEPHONE ENCOUNTER
Patient due for Pap and HPV.    Reminder done today via letter.     See Dr. Cuevas on Tuesday for follow up  Hydrate well  Albuterol MDI every 4 hours for the next 48 hours then every 4 as needed  Prednisone as prescribed  Benadryl as needed for cough and congestion  Return for difficulty breathing, persistent wheezing, worsening of symptoms, new symptoms or changes or concern

## 2024-10-31 ENCOUNTER — TRANSCRIBE ORDERS (OUTPATIENT)
Dept: OTHER | Age: 64
End: 2024-10-31

## 2024-10-31 DIAGNOSIS — K21.9 CHRONIC GERD: ICD-10-CM

## 2024-10-31 DIAGNOSIS — R13.12 OROPHARYNGEAL DYSPHAGIA: Primary | ICD-10-CM

## 2024-11-01 ENCOUNTER — APPOINTMENT (OUTPATIENT)
Dept: INTERPRETER SERVICES | Facility: CLINIC | Age: 64
End: 2024-11-01
Payer: COMMERCIAL

## 2024-11-12 ENCOUNTER — THERAPY VISIT (OUTPATIENT)
Dept: SPEECH THERAPY | Facility: CLINIC | Age: 64
End: 2024-11-12
Attending: INTERNAL MEDICINE
Payer: COMMERCIAL

## 2024-11-12 DIAGNOSIS — R13.10 DYSPHAGIA, UNSPECIFIED TYPE: Primary | ICD-10-CM

## 2024-11-12 PROCEDURE — 92610 EVALUATE SWALLOWING FUNCTION: CPT | Mod: GN

## 2024-11-12 PROCEDURE — 92526 ORAL FUNCTION THERAPY: CPT | Mod: GN

## 2024-11-12 NOTE — PROGRESS NOTES
SPEECH LANGUAGE PATHOLOGY EVALUATION  [facility]                                                                                   OUTPATIENT SPEECH LANGUAGE PATHOLOGY      PLAN OF TREATMENT FOR OUTPATIENT REHABILITATION   Patient's Last Name, First Name, Mariam Maloney YOB: 1960   Provider's Name   [facility]   Medical Record No.  [id number]     Onset Date: 10/31/24 (order date) Start of Care Date: 11/12/24     Medical Diagnosis:  Oropharyngeal dysphagia  Chronic GERD      SLP Treatment Diagnosis: dysphagia  Plan of Treatment  Frequency/Duration: x1-2/month  / up to 60 days (to accomodate for VFSS scheduling)     Certification date from 11/12/24   To 01/11/25          See note for plan of treatment details and functional goals     SHANNAN Melissa                         Referring Provider:  Ángel Giang    Initial Assessment  See Epic Evaluation- 11/12/24              Subjective        Presenting condition or subjective complaint: (Patient-Rptd) cough a lot  Pt is a 64 year old female who presents [facility] in person with an  for a clinical swallow evaluation. At time of evaluation, pt's mychart is marked by merge with no medical documents in care everywhere. Limited history provided by pt through . Pt reports PMHx of diabetes, high blood pressure/high cholesterol, asthma. Declined PMHx of stroke, brain injuries, neurodegenerative diseases. She was referred by [facility] for 'chronic GERD and oropharyngeal dysphagia'. No other notes in chart.   Date of onset: 10/31/24 (order date)    Relevant medical history: (Patient-Rptd) Asthma   Active Ambulatory Problems     Diagnosis Date Noted    No Active Ambulatory Problems     Resolved Ambulatory Problems     Diagnosis Date Noted    No Resolved Ambulatory Problems     No Additional Past Medical History      Dates & types of surgery: (Patient-Rptd) no surgery  No past  surgical history on file.   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 Reports coughing only when she is drinking water, or when eating spicy and/or a lot of seasonings with solids. Reports voice changes. On medication for acid reflux per her verbal endorsement. Declines appetite changes, premature feelings of fullness, increase belching, weight loss. States her son is worried that something is stuck in her throat. Unsure if she coughs on thin liquids with other consistencies like soup. Declines difficulty with PO medication consumption. VFSS already ordered by GI.   Current Diet/Method of Nutritional Intake: oral diet, thin liquids (level 0), regular diet        CLINICAL SWALLOW EVALUATION  Oral Motor Function: generally intact  Secretion management: Generally WFL. See laryngeal function below  Mucosal quality: adequate  Mandibular function: intact  Oral labial function: WFL  Lingual function: WFL  Laryngeal function: cough, volitional swallow, impaired vocal quality: minimal dysphonic/hypophonic. Pt coughing on saliva during volitional swallow assessment.       Level of assist required for feeding: no assistance needed   Textures Trialed:   Clinical Swallow Eval: Thin Liquids  Mode of presentation: spoon, straw, self-fed   Volume presented: 4 oz h20   Preparatory Phase: WFL  Oral Phase: premature pharyngeal  entry  Pharyngeal phase of swallow: impaired, coughing/choking   Strategies trialed during procedure: JEAN SLP CLINICAL EVAL STRATEGIES: n/a    Diagnostic statement: immediate coughing on consecutive sips via cup and straw modalities. Endorses globus sensations     Clinical Swallow Eval: Purees  Mode of presentation: spoon, self-fed   Volume presented: 1/2 applesauce cup   Preparatory Phase: WFL  Oral Phase: WFL  Pharyngeal phase of swallow: intact   Strategies trialed during procedure: JEAN SLP CLINICAL EVAL STRATEGIES: n/a    Diagnostic statement: no overt clinical s/sx of aspiration     Clinical Swallow Eval: Solids  Mode of presentation: self-fed   Volume presented: 1/2 of rice rice krisipes bar   Preparatory Phase: WFL  Oral Phase: WFL  Pharyngeal phase of swallow: intact   Strategies trialed during procedure: JEAN SLP CLINICAL EVAL STRATEGIES: n/a    Diagnostic statement: no overt clinical s/sx of aspiration      ADDITIONAL EVAL COMPLETED TODAY : n/a    ESOPHAGEAL PHASE OF SWALLOW  patient reports symptoms of esophageal dysphagia  patient presents with symptoms of esophageal dysphagia     Patient Supplied Answers To EAT Questionnaire      11/12/2024    12:44 PM   Eating Assessment Tool (EAT-10)   My swallowing problem has caused me to lose weight 0    My swallowing problem interferes with my ability to go out for meals 3    Swallowing liquids takes extra effort 4    Swallowing solids takes extra effort 0    Swallowing pills takes extra effort 0    Swallowing is painful 0    The pleasure of eating is affected by my swallowing 4    When I swallow food sticks in my throat 0    I cough when I eat 4    Swallowing is stressful 0    EAT-10 15        Patient-reported        SWALLOW ASSESSMENT CLINICAL IMPRESSIONS AND RATIONALE  Diet Consistency Recommendations: oral diet, thin liquids (level 0), regular diet    Recommended Feeding/Eating Techniques: small bolus size, slow rate of intake, alternate food and liquid intake,  maintain upright posture during/after eating for 30 minutes   Medication Administration Recommendations: whole as tolerated   Instrumental Assessment Recommendations: VFSS (videofluoroscopic swallowing study), pt already has order for VFSS from GI on 10/31/24. Ordered at same time as clinical swallow evaluation.       Assessment & Plan   CLINICAL IMPRESSIONS   Medical Diagnosis: Oropharyngeal dysphagia  Chronic GERD    Treatment Diagnosis: dysphagia   Impression/Assessment: Pt is a 64 year old female with swallow complaints. The following significant findings have been identified: impaired swallowing, characterized by s/sx of aspiration on thin liquids, globus sensations. Identified deficits interfere with their ability to consume an oral diet and maintain nutrition as compared to previous level of function.    PLAN OF CARE  Treatment Interventions: Swallowing dysfunction and/or oral function for feeding    Prognosis to achieve stated therapy goals is good, fair   Rehab potential is impacted by: pending medical intervention    Long Term Goals:   SLP Goal 1  Goal Identifier: Strategies/HEP  Goal Description: Patient will verbalize and/or demonstrate understanding of compensatory strategies, HEP, and recommendations with at least 90% accuracy given min-no cues  Target Date: 01/11/25  SLP Goal 2  Goal Identifier: VFSS  Goal Description: Patient will complete video swallow study to objectively assess swallow function, aspiration risk, guide treatment recommendations and diet recommendations within 1-2 months and will demonstrate understanding of study outcomes in order to generate/modify goals as needed  Target Date: 01/11/25      Frequency of Treatment: x1-2/month  Duration of Treatment: up to 60 days (to accomodate for VFSS scheduling)     Recommended Referrals to Other Professionals: Speech Language Pathology, VFSS. ?GI for taste s/sx of esophogeal component?.   Education Assessment:   Learner/Method:  Patient;Listening ()  Education Comments: SLP provided education regarding evaluation findings and proposed POC. Pt stating understanding    Risks and benefits of evaluation/treatment have been explained.   Patient/Family/caregiver agrees with Plan of Care.     Evaluation Time:    SLP Eval: oral/pharyngeal swallow function, clinical minutes (69060): 29     Present: Yes: Language: Portuguese, ID Number/Identifier: Provider: [facility] [[id number]]  Dept:   SERVICE [[id number]]     Signing Clinician: Yissel Avila, SHANNAN Avila, MS, CCC-SLP  Speech Language Pathologist

## 2024-12-17 ENCOUNTER — THERAPY VISIT (OUTPATIENT)
Dept: SPEECH THERAPY | Facility: CLINIC | Age: 64
End: 2024-12-17
Attending: INTERNAL MEDICINE
Payer: MEDICARE

## 2024-12-17 ENCOUNTER — ANCILLARY PROCEDURE (OUTPATIENT)
Dept: GENERAL RADIOLOGY | Facility: CLINIC | Age: 64
End: 2024-12-17
Attending: INTERNAL MEDICINE
Payer: MEDICARE

## 2024-12-17 DIAGNOSIS — K21.9 CHRONIC GERD: ICD-10-CM

## 2024-12-17 DIAGNOSIS — K52.9 GASTROENTERITIS: ICD-10-CM

## 2024-12-17 DIAGNOSIS — R13.10 DYSPHAGIA, UNSPECIFIED TYPE: Primary | ICD-10-CM

## 2024-12-17 DIAGNOSIS — K59.09 CHRONIC CONSTIPATION: ICD-10-CM

## 2024-12-17 DIAGNOSIS — R13.12 OROPHARYNGEAL DYSPHAGIA: ICD-10-CM

## 2024-12-17 DIAGNOSIS — A04.8 H. PYLORI INFECTION: ICD-10-CM

## 2024-12-17 PROCEDURE — 74230 X-RAY XM SWLNG FUNCJ C+: CPT | Mod: GC | Performed by: RADIOLOGY

## 2024-12-17 RX ORDER — BARIUM SULFATE 400 MG/ML
SUSPENSION ORAL ONCE
Status: COMPLETED | OUTPATIENT
Start: 2024-12-17 | End: 2024-12-17

## 2024-12-17 RX ADMIN — BARIUM SULFATE: 400 SUSPENSION ORAL at 09:21

## 2024-12-17 NOTE — PROGRESS NOTES
"SPEECH LANGUAGE PATHOLOGY EVALUATION              Subjective        Presenting condition or subjective complaint: (Patient-Rptd) my throat  Date of onset:      Relevant medical history:     Dates & types of surgery:      Prior diagnostic imaging/testing results: (Patient-Rptd) X-ray     Prior therapy history for the same diagnosis, illness or injury:        Living Environment  Social support: (Patient-Rptd) Alone   Help at home: (Patient-Rptd) Home management tasks (cooking, cleaning); Medication and/or finances; Home and Yard maintenance tasks; Meals on wheels/meal service  Equipment owned: (Patient-Rptd) Straight Cane     Employment: (Patient-Rptd) No    Hobbies/Interests:      Patient goals for therapy: (Patient-Rptd) yes    Pain assessment: Pain denied     Objective     SWALLOW EVALUTION  Dysphagia history: Pt reports that she feels something \"goes wrong\"  when swallowing and it makes her cough for a couple of months. Pt reports that it occurs with soups mostly. Pt reports that it happens once a month and then \"it stays longer\". Pt denies any difficulty with pills. Pt has not had any unintentional weight loss. She has not had any recent upper respiratory infection. Pt is taking medication for reflux.   Current Diet/Method of Nutritional Intake: thin liquids (level 0), regular diet        VIDEOFLUOROSCOPIC SWALLOW STUDY  Radiologist: Resident  Views Taken: left lateral, A/P   Physical location of procedure: [facility]  Patient sitting upright on chair/stool     VFSS textures trialed:   VFSS Eval: Thin Liquids  Mode of Presentation: cup, straw, self-fed   Order of Presentation: 1, 2, 3, 9, 11, 15 (AP)  Preparatory Phase: poor bolus control  Oral Phase: premature pharyngeal entry  Bolus Location When Swallow Initiated: pyriforms  Pharyngeal Phase: WFL  Rosenbeck's Penetration Aspiration Scale: 2 - contrast enters airway, remains above the vocal cords, no residue remains (penetration)  Response to " Aspiration: absent response  Strategies and Compensations: not applicable  Diagnostic Statement: Delayed swallow onset resulting in penetration, no aspiration. No pharyngeal residue.     VFSS Eval: Mildly Thick Liquids  Mode of Presentation: cup, self-fed   Order of Presentation: 4, 5  Preparatory Phase: WFL  Oral Phase: WFL  Bolus Location When Swallow Initiated: posterior angle of ramus  Pharyngeal Phase: WFL  Rosenbeck's Penetration Aspiration Scale: 1 - no aspiration, contrast does not enter airway  Response to Aspiration: absent response  Strategies and Compensations: not applicable  Diagnostic Statement: No penetration, no aspiration. No pharyngeal residue    VFSS Eval: Purees  Mode of Presentation: spoon, self-fed   Order of Presentation: 6, 7, 13 (AP), 14 (AP)  Preparatory Phase: WFL  Oral Phase: WFL  Bolus Location When Swallow Initiated: valleculae  Pharyngeal Phase: WFL  Rosenbeck s Penetration Aspiration Scale: 1 - no aspiration, contrast does not enter airway  Response to Aspiration: absent response  Strategies and Compensations: not applicable  Diagnostic Statement: No penetration, no aspiration. No pharyngeal residue    VFSS Eval: Solids  Mode of Presentation: self-fed   Order of Presentation: 8, 10  Preparatory Phase: WFL  Oral Phase: WFL  Bolus Location When Swallow Initiated: posterior angle of ramus  Pharyngeal Phase: WFL   Rosenbeck s Penetration Aspiration Scale: 1 - no aspiration, contrast does not enter airway  Response to Aspiration: absent response  Strategies and Compensations: not applicable  Diagnostic Statement: No penetration, no aspiration. No pharyngeal residue    VFSS Eval: Barium Tablet  Mode of Presentation: cup, self-fed   Order of Presentation: 12  Preparatory Phase: WFL  Oral Phase: WFL  Diagnostic Statement: Barium tablet passed without difficulty     ESOPHAGEAL PHASE OF SWALLOW  patient reports symptoms of esophageal dysphagia  esophageal sweep performed during today's  videofluoroscopic exam  please refer to radiologist's report for details     SWALLOW ASSESSMENT CLINICAL IMPRESSIONS AND RATIONALE  Diet Consistency Recommendations: thin liquids (level 0), regular diet    Recommended Feeding/Eating Techniques: small bolus size, slow rate of intake, alternate food and liquid intake   Medication Administration Recommendations: pills with water  Instrumental Assessment Recommendations:  none      Assessment & Plan   CLINICAL IMPRESSIONS   Medical Diagnosis: Oropharyngeal dysphagia    Treatment Diagnosis: Mild oropharyngeal dysphagia   Impression/Assessment: Pt is a 64 year old female with swallowing complaints. The following significant findings have been identified: Mild oropharyngeal dysphagia characterized by delayed swallow onset resulting in penetration with thin liquids only, no aspiration.  No significant pharyngeal residue that would indicate pharyngeal weakness.  Patient was educated on her swallow anatomy and results of video swallow study.  Recommend patient continue with a regular diet with thin liquids.  Patient's occasional cough could be related to esophageal dysphagia.  Recommend consult with GI.  Patient reports understanding of all information and all questions were answered within scope of practice.    PLAN OF CARE  Treatment Interventions:  eval only    Recommended Referrals to Other Professionals:  none  Education Assessment:   Learner/Method: Patient;Family  Education Comments: Pt and son were educated on the results of the swallow assessment. Both report understanding of information    Risks and benefits of evaluation/treatment have been explained.   Patient/Family/caregiver agrees with Plan of Care.     Evaluation Time:    SLP Eval: VideoFluoroscopic Swallow function Minutes (86502): 40    Evaluation Only     Signing Clinician: Oliva ALBARRAN [facility]                                                                                    OUTPATIENT SPEECH LANGUAGE PATHOLOGY

## (undated) RX ORDER — ERYTHROMYCIN 5 MG/G
OINTMENT OPHTHALMIC
Status: DISPENSED
Start: 2017-10-31

## (undated) RX ORDER — LIDOCAINE HYDROCHLORIDE AND EPINEPHRINE 15; 5 MG/ML; UG/ML
INJECTION, SOLUTION EPIDURAL
Status: DISPENSED
Start: 2017-10-31